# Patient Record
Sex: MALE | Race: OTHER | Employment: OTHER | URBAN - METROPOLITAN AREA
[De-identification: names, ages, dates, MRNs, and addresses within clinical notes are randomized per-mention and may not be internally consistent; named-entity substitution may affect disease eponyms.]

---

## 2023-11-23 ENCOUNTER — HOSPITAL ENCOUNTER (EMERGENCY)
Facility: HOSPITAL | Age: 68
Discharge: HOME | End: 2023-11-23
Attending: EMERGENCY MEDICINE
Payer: MEDICARE

## 2023-11-23 VITALS
HEIGHT: 68 IN | WEIGHT: 170 LBS | DIASTOLIC BLOOD PRESSURE: 75 MMHG | SYSTOLIC BLOOD PRESSURE: 125 MMHG | HEART RATE: 85 BPM | RESPIRATION RATE: 20 BRPM | OXYGEN SATURATION: 97 % | BODY MASS INDEX: 25.76 KG/M2 | TEMPERATURE: 99 F

## 2023-11-23 DIAGNOSIS — R55 NEAR SYNCOPE: ICD-10-CM

## 2023-11-23 LAB
ALBUMIN SERPL BCP-MCNC: 4.1 G/DL (ref 3.5–5.2)
ALP SERPL-CCNC: 83 U/L (ref 55–135)
ALT SERPL W/O P-5'-P-CCNC: 22 U/L (ref 10–44)
ANION GAP SERPL CALC-SCNC: 12 MMOL/L (ref 8–16)
AST SERPL-CCNC: 21 U/L (ref 10–40)
BACTERIA #/AREA URNS AUTO: ABNORMAL /HPF
BASOPHILS # BLD AUTO: 0.01 K/UL (ref 0–0.2)
BASOPHILS NFR BLD: 0.2 % (ref 0–1.9)
BILIRUB SERPL-MCNC: 0.8 MG/DL (ref 0.1–1)
BILIRUB UR QL STRIP: NEGATIVE
BNP SERPL-MCNC: 50 PG/ML (ref 0–99)
BUN SERPL-MCNC: 18 MG/DL (ref 8–23)
CALCIUM SERPL-MCNC: 9.9 MG/DL (ref 8.7–10.5)
CHLORIDE SERPL-SCNC: 106 MMOL/L (ref 95–110)
CLARITY UR REFRACT.AUTO: CLEAR
CO2 SERPL-SCNC: 24 MMOL/L (ref 23–29)
COLOR UR AUTO: YELLOW
CREAT SERPL-MCNC: 1.1 MG/DL (ref 0.5–1.4)
DIFFERENTIAL METHOD: ABNORMAL
EOSINOPHIL # BLD AUTO: 0.1 K/UL (ref 0–0.5)
EOSINOPHIL NFR BLD: 1.2 % (ref 0–8)
ERYTHROCYTE [DISTWIDTH] IN BLOOD BY AUTOMATED COUNT: 13.6 % (ref 11.5–14.5)
EST. GFR  (NO RACE VARIABLE): >60 ML/MIN/1.73 M^2
GLUCOSE SERPL-MCNC: 119 MG/DL (ref 70–110)
GLUCOSE UR QL STRIP: ABNORMAL
HCT VFR BLD AUTO: 42.8 % (ref 40–54)
HGB BLD-MCNC: 14.4 G/DL (ref 14–18)
HGB UR QL STRIP: NEGATIVE
HYALINE CASTS UR QL AUTO: 9 /LPF
IMM GRANULOCYTES # BLD AUTO: 0.02 K/UL (ref 0–0.04)
IMM GRANULOCYTES NFR BLD AUTO: 0.3 % (ref 0–0.5)
KETONES UR QL STRIP: NEGATIVE
LEUKOCYTE ESTERASE UR QL STRIP: NEGATIVE
LYMPHOCYTES # BLD AUTO: 0.9 K/UL (ref 1–4.8)
LYMPHOCYTES NFR BLD: 16 % (ref 18–48)
MAGNESIUM SERPL-MCNC: 2.1 MG/DL (ref 1.6–2.6)
MCH RBC QN AUTO: 27.7 PG (ref 27–31)
MCHC RBC AUTO-ENTMCNC: 33.6 G/DL (ref 32–36)
MCV RBC AUTO: 83 FL (ref 82–98)
MICROSCOPIC COMMENT: ABNORMAL
MONOCYTES # BLD AUTO: 0.4 K/UL (ref 0.3–1)
MONOCYTES NFR BLD: 6.7 % (ref 4–15)
NEUTROPHILS # BLD AUTO: 4.4 K/UL (ref 1.8–7.7)
NEUTROPHILS NFR BLD: 75.6 % (ref 38–73)
NITRITE UR QL STRIP: NEGATIVE
NRBC BLD-RTO: 0 /100 WBC
PH UR STRIP: 6 [PH] (ref 5–8)
PLATELET # BLD AUTO: 183 K/UL (ref 150–450)
PMV BLD AUTO: 11.4 FL (ref 9.2–12.9)
POTASSIUM SERPL-SCNC: 3.9 MMOL/L (ref 3.5–5.1)
PROT SERPL-MCNC: 7.4 G/DL (ref 6–8.4)
PROT UR QL STRIP: ABNORMAL
RBC # BLD AUTO: 5.19 M/UL (ref 4.6–6.2)
RBC #/AREA URNS AUTO: 1 /HPF (ref 0–4)
SODIUM SERPL-SCNC: 142 MMOL/L (ref 136–145)
SP GR UR STRIP: 1.03 (ref 1–1.03)
TROPONIN I SERPL DL<=0.01 NG/ML-MCNC: <0.006 NG/ML (ref 0–0.03)
URN SPEC COLLECT METH UR: ABNORMAL
WBC # BLD AUTO: 5.82 K/UL (ref 3.9–12.7)
WBC #/AREA URNS AUTO: 2 /HPF (ref 0–5)

## 2023-11-23 PROCEDURE — 93005 ELECTROCARDIOGRAM TRACING: CPT

## 2023-11-23 PROCEDURE — 83880 ASSAY OF NATRIURETIC PEPTIDE: CPT | Performed by: EMERGENCY MEDICINE

## 2023-11-23 PROCEDURE — 99284 EMERGENCY DEPT VISIT MOD MDM: CPT

## 2023-11-23 PROCEDURE — 84484 ASSAY OF TROPONIN QUANT: CPT | Performed by: EMERGENCY MEDICINE

## 2023-11-23 PROCEDURE — 93010 EKG 12-LEAD: ICD-10-PCS | Mod: ,,, | Performed by: INTERNAL MEDICINE

## 2023-11-23 PROCEDURE — 80053 COMPREHEN METABOLIC PANEL: CPT | Performed by: EMERGENCY MEDICINE

## 2023-11-23 PROCEDURE — 85025 COMPLETE CBC W/AUTO DIFF WBC: CPT | Performed by: EMERGENCY MEDICINE

## 2023-11-23 PROCEDURE — 83735 ASSAY OF MAGNESIUM: CPT | Performed by: EMERGENCY MEDICINE

## 2023-11-23 PROCEDURE — 25000003 PHARM REV CODE 250: Performed by: EMERGENCY MEDICINE

## 2023-11-23 PROCEDURE — 81001 URINALYSIS AUTO W/SCOPE: CPT | Performed by: EMERGENCY MEDICINE

## 2023-11-23 PROCEDURE — 93010 ELECTROCARDIOGRAM REPORT: CPT | Mod: ,,, | Performed by: INTERNAL MEDICINE

## 2023-11-23 RX ADMIN — SODIUM CHLORIDE 1000 ML: 9 INJECTION, SOLUTION INTRAVENOUS at 03:11

## 2023-11-23 NOTE — ED PROVIDER NOTES
"Encounter Date: 11/23/2023       History     Chief Complaint   Patient presents with    Weakness     Pt had a brief period of light headedness and became diaphoretic. Pt states he started taking a new medication for his prostate. Denies any cardiac history.      68-year-old male with history of hypertension presents with complaint of near-syncope.  Patient reports that he was eating dinner when he felt like he was going to pass out.  He reports darkening of his peripheral vision and perception of voices being further away.  His wife reports that he briefly became unresponsive and then told her that he was feeling okay.  He was briefly diaphoretic.  He reports that he recently started a new prostate medication, alfuzosin as prescribed by his PCM.  He took the 1st dose this morning.  He reports feeling "funny" when rising from his recliner at 11:00 a.m. this morning.      Review of patient's allergies indicates:  No Known Allergies  History reviewed. No pertinent past medical history.  History reviewed. No pertinent surgical history.  History reviewed. No pertinent family history.  Social History     Tobacco Use    Smoking status: Never    Smokeless tobacco: Never   Substance Use Topics    Drug use: Never     Review of Systems  All other systems reviewed and negative except as noted in HPI    Physical Exam     Initial Vitals   BP Pulse Resp Temp SpO2   11/23/23 1516 11/23/23 1516 11/23/23 1516 11/23/23 1556 11/23/23 1516   123/73 74 18 98.2 °F (36.8 °C) 97 %      MAP       --                Physical Exam  General: AO x 3, NAD. Well nourished. Well Developed  Head: Normocephalic and Atraumatic  HEENT: PERRLA. EOMI. OP Clear  Neck: Supple, Nontender in midline.  Cardiovascular: RRR. No m/r/g. 2+ Distal Pulses  Pulm/Chest: Chest nontender. Lungs clear to auscultation. No respiratory distress.  Abdomen: Nontender. Nondistended. No rigidity, rebound, or guarding  MSK: extremities atraumatic x 4. Gait normal  Ext: no " clubbing, cyanosis, or edema  Neuro: GCS 15. CN II-XII intact. Intact symmetric sensation, strength, DTR x 4 extremities  Skin: no bullae, petechiae, or purpura. Warm, dry, and intact.  Psych: normal mood and affect.    ED Course   Procedures  Labs Reviewed   CBC W/ AUTO DIFFERENTIAL - Abnormal; Notable for the following components:       Result Value    Lymph # 0.9 (*)     Gran % 75.6 (*)     Lymph % 16.0 (*)     All other components within normal limits   COMPREHENSIVE METABOLIC PANEL - Abnormal; Notable for the following components:    Glucose 119 (*)     All other components within normal limits   URINALYSIS, REFLEX TO URINE CULTURE - Abnormal; Notable for the following components:    Protein, UA 1+ (*)     Glucose, UA Trace (*)     All other components within normal limits    Narrative:     Specimen Source->Urine   URINALYSIS MICROSCOPIC - Abnormal; Notable for the following components:    Hyaline Casts, UA 9 (*)     All other components within normal limits    Narrative:     Specimen Source->Urine   B-TYPE NATRIURETIC PEPTIDE   MAGNESIUM   TROPONIN I        ECG Results              EKG 12-lead (Final result)  Result time 11/24/23 10:22:42      Final result by Interface, Lab In [facility] (11/24/23 10:22:42)                   Narrative:    Test Reason : R55,    Vent. Rate : 084 BPM     Atrial Rate : 084 BPM     P-R Int : 174 ms          QRS Dur : 100 ms      QT Int : 366 ms       P-R-T Axes : 045 -30 029 degrees     QTc Int : 432 ms    Normal sinus rhythm  Left axis deviation  Abnormal ECG  No previous ECGs available  Confirmed by Wally Wilson MD ([id number]) on 11/24/2023 10:22:32 AM    Referred By: [email or web address]   SELF           Confirmed By:Wally Wilson MD                                  Imaging Results    None          Medications   sodium chloride 0.9% bolus 1,000 mL 1,000 mL (1,000 mLs Intravenous New Bag 11/23/23 6089)     Medical Decision Making  Presentation most consistent with orthostatic hypotension and  near-syncope secondary to 1st dose of IV Zosyn.  However, given report of diaphoresis and age greater than 65, will obtain CBC, chemistry, troponin, BNP, EKG.    Amount and/or Complexity of Data Reviewed  Independent Historian: EMS     Details: Difficulty obtaining IV access and route.  No hypoglycemia  External Data Reviewed: labs, radiology, ECG and notes.     Details: No history of arrhythmia or coronary event  Labs: ordered. Decision-making details documented in ED Course.  ECG/medicine tests: ordered and independent interpretation performed. Decision-making details documented in ED Course.    Risk  Prescription drug management.  Decision regarding hospitalization.               ED Course as of 11/24/23 1540   Thu Nov 23, 2023   1715 CBC auto differential(!) [DS]   1715 Comprehensive metabolic panel(!) [DS]   1715 Magnesium [DS]   1715 Brain natriuretic peptide [DS]   1715 Troponin I [DS]   1715 Urinalysis, Reflex to Urine Culture Urine, Clean Catch  Lab assays reassuring. [DS]   1715 EKG shows normal sinus rhythm.  No STEMI.  No acute abnormality on independent review.  No prior available for comparison [DS]   1718 Engage in shared decision-making with patient.  He would like to be discharged home this is reasonable.  I will place a cardiology referral.  However, the patient does live a little over an hour away.  Also has a good relationship with his primary like to continue his workup through his primary care doctor.  This is reasonable.  I counseled him he will likely need an echocardiogram and a Holter monitor read by a cardiologist.  Additionally, I counseled discontinue the use of alfuzosin.  I told him to return to the emergency department immediately if any new or concerning symptoms occur. [DS]      ED Course User Index  [DS] Sessions, Vaughn LOCKWOOD MD                        Clinical Impression:  Final diagnoses:  [R55] Near syncope          ED Disposition Condition    Discharge Stable          ED  Prescriptions    None       Follow-up Information    None          Sessions, Vaughn LOCKWOOD MD  11/24/23 3726

## 2023-11-23 NOTE — ED TRIAGE NOTES
Enmanuel Bahena, an 68 y.o. male presents to the ED via EMS for complaints of syncopal episode at home. Recently started a new prostate medication and while at the dinner table he became diaphoretic, weak, and lost consciousness for 15 seconds per family member. Pt BP with EMS was systolic in the 70s, no interventions required as BP normalized upon arrival to ED. Pt denies all complaints while in the ED stretcher. Family at bedside. Denies CP, SOB, abd pain, NVD, dizziness. Denies cardiac hx.       LOC: The patient is awake, alert and aware of environment with an appropriate affect, the patient is oriented x 3 and speaking appropriately.   APPEARANCE: Patient appears comfortable and in no acute distress, patient is clean and well groomed.  SKIN: The skin is warm and dry, color consistent with ethnicity.   MUSCULOSKELETAL: Patient moving all extremities spontaneously, no swelling noted.  RESPIRATORY: Airway is open and patent, respirations are spontaneous, patient has a normal effort and rate, no accessory muscle use noted.  CARDIAC: Patient has a normal rate and regular rhythm, no edema noted, capillary refill < 3 seconds.   GASTRO: Soft and non tender to palpation, no distention noted.   : Pt denies any pain or frequency with urination.  NEURO: Pt opens eyes spontaneously, behavior appropriate to situation, follows commands.        Chief Complaint   Patient presents with    Weakness     Pt had a brief period of light headedness and became diaphoretic. Pt states he started taking a new medication for his prostate. Denies any cardiac history.      Review of patient's allergies indicates:  Not on File  No past medical history on file.

## 2023-11-23 NOTE — DISCHARGE INSTRUCTIONS
Discontinue your alfuzosin.  Talk with your urologist about restarting it or switching to a different medication.  Cardiology clinic will contact you to help arrange follow-up.  If you would prefer to continue your workup thru your primary care, This is reasonable.  Give your primary care a call on Monday to let them know about your ER visit and schedule an echocardiogram and a Holter monitor.  Return to the emergency department immediately if any new or concerning symptoms occur